# Patient Record
Sex: MALE | Race: WHITE
[De-identification: names, ages, dates, MRNs, and addresses within clinical notes are randomized per-mention and may not be internally consistent; named-entity substitution may affect disease eponyms.]

---

## 2019-08-13 ENCOUNTER — HOSPITAL ENCOUNTER (EMERGENCY)
Dept: HOSPITAL 57 - BURERS | Age: 79
Discharge: HOME | End: 2019-08-13
Payer: MEDICARE

## 2019-08-13 DIAGNOSIS — S01.01XA: Primary | ICD-10-CM

## 2019-08-13 DIAGNOSIS — I10: ICD-10-CM

## 2019-08-13 DIAGNOSIS — Z79.899: ICD-10-CM
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
## 2019-08-13 PROCEDURE — 12002 RPR S/N/AX/GEN/TRNK2.6-7.5CM: CPT

## 2019-08-13 PROCEDURE — 70450 CT HEAD/BRAIN W/O DYE: CPT

## 2019-08-13 NOTE — CT
CT BRAIN:

 

08/13/2019

 

PROVIDED CLINICAL HISTORY:

Head injury.

 

FINDINGS:

The ventricular system appears normal in size and morphology.  There is no evidence for intracranial 
hemorrhage or mass effect.  Left frontal scalp laceration without evidence for skull fracture.

 

IMPRESSION:

No evidence for intracranial hemorrhage or skull fracture.

 

POS: [facility]